# Patient Record
Sex: MALE | Race: WHITE | NOT HISPANIC OR LATINO | URBAN - METROPOLITAN AREA
[De-identification: names, ages, dates, MRNs, and addresses within clinical notes are randomized per-mention and may not be internally consistent; named-entity substitution may affect disease eponyms.]

---

## 2017-09-05 ENCOUNTER — EMERGENCY (EMERGENCY)
Facility: HOSPITAL | Age: 22
LOS: 0 days | Discharge: HOME | End: 2017-09-05
Attending: EMERGENCY MEDICINE | Admitting: EMERGENCY MEDICINE
Payer: SUBSIDIZED

## 2017-09-05 VITALS — WEIGHT: 149.91 LBS | HEIGHT: 70 IN

## 2017-09-05 VITALS
RESPIRATION RATE: 20 BRPM | OXYGEN SATURATION: 100 % | DIASTOLIC BLOOD PRESSURE: 68 MMHG | SYSTOLIC BLOOD PRESSURE: 145 MMHG | HEART RATE: 77 BPM | TEMPERATURE: 98 F

## 2017-09-05 DIAGNOSIS — R10.9 UNSPECIFIED ABDOMINAL PAIN: ICD-10-CM

## 2017-09-05 DIAGNOSIS — N13.2 HYDRONEPHROSIS WITH RENAL AND URETERAL CALCULOUS OBSTRUCTION: ICD-10-CM

## 2017-09-05 LAB
ALBUMIN SERPL ELPH-MCNC: 4.8 G/DL — SIGNIFICANT CHANGE UP (ref 3.3–5)
ALP SERPL-CCNC: 83 U/L — SIGNIFICANT CHANGE UP (ref 40–120)
ALT FLD-CCNC: 32 U/L — SIGNIFICANT CHANGE UP (ref 12–78)
ANION GAP SERPL CALC-SCNC: 11 MMOL/L — SIGNIFICANT CHANGE UP (ref 5–17)
APPEARANCE UR: CLEAR — SIGNIFICANT CHANGE UP
AST SERPL-CCNC: 24 U/L — SIGNIFICANT CHANGE UP (ref 15–37)
BACTERIA # UR AUTO: (no result)
BASOPHILS # BLD AUTO: 0.1 K/UL — SIGNIFICANT CHANGE UP (ref 0–0.2)
BASOPHILS NFR BLD AUTO: 0.7 % — SIGNIFICANT CHANGE UP (ref 0–2)
BILIRUB SERPL-MCNC: 0.6 MG/DL — SIGNIFICANT CHANGE UP (ref 0.2–1.2)
BILIRUB UR-MCNC: NEGATIVE — SIGNIFICANT CHANGE UP
BUN SERPL-MCNC: 19 MG/DL — SIGNIFICANT CHANGE UP (ref 7–23)
CALCIUM SERPL-MCNC: 9.8 MG/DL — SIGNIFICANT CHANGE UP (ref 8.5–10.1)
CHLORIDE SERPL-SCNC: 104 MMOL/L — SIGNIFICANT CHANGE UP (ref 96–108)
CO2 SERPL-SCNC: 23 MMOL/L — SIGNIFICANT CHANGE UP (ref 22–31)
COLOR SPEC: YELLOW — SIGNIFICANT CHANGE UP
CREAT SERPL-MCNC: 1.45 MG/DL — HIGH (ref 0.5–1.3)
DIFF PNL FLD: (no result)
EOSINOPHIL # BLD AUTO: 0 K/UL — SIGNIFICANT CHANGE UP (ref 0–0.5)
EOSINOPHIL NFR BLD AUTO: 0.3 % — SIGNIFICANT CHANGE UP (ref 0–6)
EPI CELLS # UR: SIGNIFICANT CHANGE UP
GLUCOSE SERPL-MCNC: 163 MG/DL — HIGH (ref 70–99)
GLUCOSE UR QL: NEGATIVE MG/DL — SIGNIFICANT CHANGE UP
HCT VFR BLD CALC: 44 % — SIGNIFICANT CHANGE UP (ref 39–50)
HGB BLD-MCNC: 16.1 G/DL — SIGNIFICANT CHANGE UP (ref 13–17)
KETONES UR-MCNC: (no result)
LEUKOCYTE ESTERASE UR-ACNC: (no result)
LIDOCAIN IGE QN: 109 U/L — SIGNIFICANT CHANGE UP (ref 73–393)
LYMPHOCYTES # BLD AUTO: 18.3 % — SIGNIFICANT CHANGE UP (ref 13–44)
LYMPHOCYTES # BLD AUTO: 2.8 K/UL — SIGNIFICANT CHANGE UP (ref 1–3.3)
MCHC RBC-ENTMCNC: 30.5 PG — SIGNIFICANT CHANGE UP (ref 27–34)
MCHC RBC-ENTMCNC: 36.7 GM/DL — HIGH (ref 32–36)
MCV RBC AUTO: 83.1 FL — SIGNIFICANT CHANGE UP (ref 80–100)
MONOCYTES # BLD AUTO: 1.1 K/UL — HIGH (ref 0–0.9)
MONOCYTES NFR BLD AUTO: 6.8 % — SIGNIFICANT CHANGE UP (ref 2–14)
NEUTROPHILS # BLD AUTO: 11.5 K/UL — HIGH (ref 1.8–7.4)
NEUTROPHILS NFR BLD AUTO: 74 % — SIGNIFICANT CHANGE UP (ref 43–77)
NITRITE UR-MCNC: NEGATIVE — SIGNIFICANT CHANGE UP
PH UR: 8 — SIGNIFICANT CHANGE UP (ref 5–8)
PLATELET # BLD AUTO: 376 K/UL — SIGNIFICANT CHANGE UP (ref 150–400)
POTASSIUM SERPL-MCNC: 3.2 MMOL/L — LOW (ref 3.5–5.3)
POTASSIUM SERPL-SCNC: 3.2 MMOL/L — LOW (ref 3.5–5.3)
PROT SERPL-MCNC: 8.2 GM/DL — SIGNIFICANT CHANGE UP (ref 6–8.3)
PROT UR-MCNC: 15 MG/DL
RBC # BLD: 5.3 M/UL — SIGNIFICANT CHANGE UP (ref 4.2–5.8)
RBC # FLD: 10 % — LOW (ref 10.3–14.5)
RBC CASTS # UR COMP ASSIST: (no result) /HPF (ref 0–4)
SODIUM SERPL-SCNC: 138 MMOL/L — SIGNIFICANT CHANGE UP (ref 135–145)
SP GR SPEC: 1.01 — SIGNIFICANT CHANGE UP (ref 1.01–1.02)
UROBILINOGEN FLD QL: 1 MG/DL
WBC # BLD: 15.5 K/UL — HIGH (ref 3.8–10.5)
WBC # FLD AUTO: 15.5 K/UL — HIGH (ref 3.8–10.5)
WBC UR QL: SIGNIFICANT CHANGE UP

## 2017-09-05 PROCEDURE — 74177 CT ABD & PELVIS W/CONTRAST: CPT | Mod: 26

## 2017-09-05 PROCEDURE — 99284 EMERGENCY DEPT VISIT MOD MDM: CPT

## 2017-09-05 RX ORDER — CEPHALEXIN 500 MG
1 CAPSULE ORAL
Qty: 14 | Refills: 0 | OUTPATIENT
Start: 2017-09-05 | End: 2017-09-12

## 2017-09-05 RX ORDER — TAMSULOSIN HYDROCHLORIDE 0.4 MG/1
0.4 CAPSULE ORAL AT BEDTIME
Qty: 0 | Refills: 0 | Status: DISCONTINUED | OUTPATIENT
Start: 2017-09-05 | End: 2017-09-05

## 2017-09-05 RX ORDER — ONDANSETRON 8 MG/1
4 TABLET, FILM COATED ORAL ONCE
Qty: 0 | Refills: 0 | Status: COMPLETED | OUTPATIENT
Start: 2017-09-05 | End: 2017-09-05

## 2017-09-05 RX ORDER — FAMOTIDINE 10 MG/ML
20 INJECTION INTRAVENOUS ONCE
Qty: 0 | Refills: 0 | Status: COMPLETED | OUTPATIENT
Start: 2017-09-05 | End: 2017-09-05

## 2017-09-05 RX ORDER — CEPHALEXIN 500 MG
500 CAPSULE ORAL EVERY 12 HOURS
Qty: 0 | Refills: 0 | Status: DISCONTINUED | OUTPATIENT
Start: 2017-09-05 | End: 2017-09-05

## 2017-09-05 RX ORDER — TAMSULOSIN HYDROCHLORIDE 0.4 MG/1
1 CAPSULE ORAL
Qty: 6 | Refills: 0 | OUTPATIENT
Start: 2017-09-05

## 2017-09-05 RX ORDER — SODIUM CHLORIDE 9 MG/ML
1000 INJECTION INTRAMUSCULAR; INTRAVENOUS; SUBCUTANEOUS ONCE
Qty: 0 | Refills: 0 | Status: COMPLETED | OUTPATIENT
Start: 2017-09-05 | End: 2017-09-05

## 2017-09-05 RX ORDER — MORPHINE SULFATE 50 MG/1
4 CAPSULE, EXTENDED RELEASE ORAL ONCE
Qty: 0 | Refills: 0 | Status: DISCONTINUED | OUTPATIENT
Start: 2017-09-05 | End: 2017-09-05

## 2017-09-05 RX ORDER — OXYCODONE AND ACETAMINOPHEN 5; 325 MG/1; MG/1
1 TABLET ORAL ONCE
Qty: 0 | Refills: 0 | Status: COMPLETED | OUTPATIENT
Start: 2017-09-05 | End: 2017-09-05

## 2017-09-05 RX ADMIN — TAMSULOSIN HYDROCHLORIDE 0.4 MILLIGRAM(S): 0.4 CAPSULE ORAL at 22:36

## 2017-09-05 RX ADMIN — Medication 500 MILLIGRAM(S): at 22:36

## 2017-09-05 RX ADMIN — ONDANSETRON 4 MILLIGRAM(S): 8 TABLET, FILM COATED ORAL at 20:22

## 2017-09-05 RX ADMIN — SODIUM CHLORIDE 1000 MILLILITER(S): 9 INJECTION INTRAMUSCULAR; INTRAVENOUS; SUBCUTANEOUS at 19:19

## 2017-09-05 RX ADMIN — MORPHINE SULFATE 4 MILLIGRAM(S): 50 CAPSULE, EXTENDED RELEASE ORAL at 19:19

## 2017-09-05 RX ADMIN — ONDANSETRON 4 MILLIGRAM(S): 8 TABLET, FILM COATED ORAL at 19:19

## 2017-09-05 RX ADMIN — FAMOTIDINE 20 MILLIGRAM(S): 10 INJECTION INTRAVENOUS at 20:22

## 2017-09-05 RX ADMIN — MORPHINE SULFATE 4 MILLIGRAM(S): 50 CAPSULE, EXTENDED RELEASE ORAL at 20:22

## 2017-09-05 NOTE — ED STATDOCS - OBJECTIVE STATEMENT
22 y/o male with no PMHx presents to the ED c/o abd pain. 22 y/o male with no PMHx presents to the ED c/o right lower abd pain x 2 hours. Pt also c/o N/V, increased urgency of urination, dysuria. Denies diarrhea, hematuria. Nonsmoker. Pt is experiencing discomfort in ED. No recent travel.

## 2017-09-05 NOTE — ED STATDOCS - ENMT, MLM
Nasal mucosa clear.  Mouth with normal mucosa  Throat has no vesicles, no oropharyngeal exudates and uvula is midline. Nasal mucosa clear.  Mouth dry mucosa  Throat has no vesicles, no oropharyngeal exudates and uvula is midline.

## 2017-09-05 NOTE — ED STATDOCS - PROGRESS NOTE DETAILS
Patient seen and evaluated, ED attending note and orders reviewed, will continue with patient follow up and care -Yair Smith PA-C Reviewed labs and CT results with patient, + kidney stone, has some perinephric stranding with 15 white count so will also treat with abx and refer to urology -Yair Smith PA-C

## 2017-09-05 NOTE — ED STATDOCS - ATTENDING CONTRIBUTION TO CARE
I, Juan Albarran MD, personally saw the patient with ACP.  I have personally performed a face to face diagnostic evaluation on this patient.  I have reviewed the ACP note and agree with the history, exam, and plan of care, except as noted.

## 2017-09-05 NOTE — ED ADULT NURSE NOTE - OBJECTIVE STATEMENT
pt presents to Ed with RLQ pain and diff urinating since this afternoon. afebrile. denies n/v/d. breathing si even and unlabored. a&ox3. will continue to monitor and reassess

## 2017-09-05 NOTE — ED STATDOCS - CONSTITUTIONAL, MLM
normal... well appearing, well nourished, and in no apparent distress. pt is in moderate distress secondary to pain

## 2017-09-06 LAB
CULTURE RESULTS: NO GROWTH — SIGNIFICANT CHANGE UP
SPECIMEN SOURCE: SIGNIFICANT CHANGE UP

## 2019-12-09 NOTE — ED ADULT NURSE NOTE - CHIEF COMPLAINT
The patient is a 21y Male complaining of abdominal pain.
Bladder non-tender and non-distended. Urine clear yellow.

## 2025-02-27 NOTE — ED ADULT TRIAGE NOTE - MODE OF ARRIVAL
LMTCB in regards upcoming procedure.  
Patient informed about scheduled procedure on 05/12/2025 pre-surgical instructions given,portal message sent as well.  
Walk in

## 2025-08-05 NOTE — ED STATDOCS - CHPI ED SYMPTOM POS
"  CC:   Chief Complaint   Patient presents with    Diabetes Mellitus       HPI: Catalina Mcgarry is a 70 y.o. female presents for a follow-up visit today for the management of T1DM      She was diagnosed with Type 1 diabetes during pregnancy in 1978 and started on insulin therapy     Family hx of diabetes: mother had T2DM, son with T1DM,   Hospitalized for diabetes?   Insulin therapy?    No personal or FH of thyroid cancer or personal of pancreatic cancer or pancreatitis.     2/2011--- +FLORA and c-peptide <0.1   9/2023---c-peptide - <0.08       Our last visit was in October 2024  Missed follow up visits   Now being seen in August 2025  At our last visit we   -- Medication Changes:   Continued Omnipod 5  Change NovoLog to Fiasp      Stay in automated mode 100% of the time  Reviewed correction boluses---discussed hitting "use sensor"         Pump settings:  Basal:  MN- 12 ( noon)- continue 0.85 units/hr   12 (noon)- MN: increase to 1 unit/hr --adjusted based on download for when she is in manual mode  ICR:1:8--tighten  ISF: 1:40-tighten  Target: 110  IOB: 3 hours      She met with education after our visit today to get the ramesh on her phone for the Omnipod 5     Last A1c is from January 2025- 7.9%                                 DIABETES COMPLICATIONS: retinopathy      Diabetes Management Status    ASA:  No    Statin:taking Crestor 10 mg now  ACE/ARB: Not taking    The 10-year ASCVD risk score (Dalia LIRA, et al., 2019) is: 38.6%    Values used to calculate the score:      Age: 70 years      Sex: Female      Is Non- : Yes      Diabetic: Yes      Tobacco smoker: Yes      Systolic Blood Pressure: 118 mmHg      Is BP treated: No      HDL Cholesterol: 59 mg/dL      Total Cholesterol: 191 mg/dL      Screening or Prevention Patient's value Goal Complete/Controlled?   HgA1C Testing and Control   Lab Results   Component Value Date    HGBA1C 7.9 (H) 01/14/2025      Annually/Less than 8% Yes   Lipid profile : " 01/14/2025 Annually Yes   LDL control Lab Results   Component Value Date    LDLCALC 113.8 01/14/2025    Annually/Less than 100 mg/dl  No   Nephropathy screening Lab Results   Component Value Date    LABMICR 12.0 08/27/2024     Lab Results   Component Value Date    PROTEINUA Negative 09/29/2023    Annually Yes   Blood pressure BP Readings from Last 1 Encounters:   08/06/25 118/64    Less than 140/90 Yes   Dilated retinal exam : 06/17/2014 Annually No   Foot exam   : 12/03/2024 Annually Yes       CURRENT A1C:    Hemoglobin A1C   Date Value Ref Range Status   01/14/2025 7.9 (H) 4.0 - 5.6 % Final     Comment:     ADA Screening Guidelines:  5.7-6.4%  Consistent with prediabetes  >or=6.5%  Consistent with diabetes    High levels of fetal hemoglobin interfere with the HbA1C  assay. Heterozygous hemoglobin variants (HbS, HgC, etc)do  not significantly interfere with this assay.   However, presence of multiple variants may affect accuracy.     10/30/2024 7.6 (H) 4.0 - 5.6 % Final     Comment:     ADA Screening Guidelines:  5.7-6.4%  Consistent with prediabetes  >or=6.5%  Consistent with diabetes    High levels of fetal hemoglobin interfere with the HbA1C  assay. Heterozygous hemoglobin variants (HbS, HgC, etc)do  not significantly interfere with this assay.   However, presence of multiple variants may affect accuracy.     08/26/2024 7.6 (H) 4.0 - 5.6 % Final     Comment:     ADA Screening Guidelines:  5.7-6.4%  Consistent with prediabetes  >or=6.5%  Consistent with diabetes    High levels of fetal hemoglobin interfere with the HbA1C  assay. Heterozygous hemoglobin variants (HbS, HgC, etc)do  not significantly interfere with this assay.   However, presence of multiple variants may affect accuracy.         GOAL A1C: 7% without hypoglycemia       DM MEDICATIONS USED IN THE PAST: Glipizide   Dexcom G6   Omnipod 5   Lantus   Novolog   Irving   Fiasp       CURRENT DIABETES MEDICATIONS:     Insulin Pump: Omnipod 5 with Fiasp   Pump  settings:  Pump settings:  Basal:  MN- 12 ( noon)- continue 0.85 units/hr   12 (noon)- MN: increase to 1 unit/hr --adjusted based on download for when she is in manual mode  ICR:1:9-- she went back to 1:9 from 1:8  ISF: 1:40-tighten  Target: 110  IOB: 3 hours       Pump site change: q 3 days   Cartridge change: q 3 days  Insulin TDD:  37.9 units    Basal 67%   Bolus 33%   CHO intake: 107.5 g   2.6 entries per day     94% in auto mode   1% automated limited   6% manual mode     Back up Lantus/Levemir: Lantus     Patient's pump was downloaded in clinic today and reviewed with patient.   Please see attached documents for pump download.         BLOOD GLUCOSE MONITORING:    Sensor type: Dexcom G6    Average BG readin  Time in range: 63%   GMI- 7.4%  31% high   6% very high   0% low   <1% very low    Site change:  q10 days    2 weeks prior average was 170  GMI- 7.4%  62% in range     Sensor was downloaded in clinic today and reviewed with patient.   Please see attached document for download.       Supplies from [facility]         HYPOGLYCEMIA: 0% low   <1% very low     2 weeks prior- 0% low  0% very low     She does attest to low overnight   If BG is 120-130's before bed- she says that she is dropping overnight   .  Glucagon kit: baqsimi   Medic alert bracelet: denies         MEALS: eating 3 meals per day   She says that she is doing better about CHO counting -- she feels like she is entering in the right amount- -107.5  grams of carbs   BF:  Lunch:   Dinner:-- she sits with Mrs Lee every day from 4PM- 8PM -- sometimes Rosa skips dinner- so she will have a tuna fish sandwich   Snack: little cup of blue bell ice cream   Drinks: premier protein shakes daily - taking away from her milk        CURRENT EXERCISE:  No      Review of Systems  Review of Systems   Constitutional:  Negative for appetite change, fatigue and unexpected weight change.   HENT:  Negative for trouble swallowing.    Eyes:  Negative for visual  disturbance.   Respiratory:  Negative for shortness of breath.    Cardiovascular:  Negative for chest pain.   Gastrointestinal:  Negative for nausea.   Endocrine: Negative for polydipsia, polyphagia and polyuria.   Genitourinary:         No Nocturia    Skin:  Negative for wound.   Neurological:  Negative for numbness.       Physical Exam   Physical Exam  Vitals and nursing note reviewed.   Constitutional:       General: She is not in acute distress.     Appearance: She is well-developed. She is not ill-appearing.   HENT:      Head: Normocephalic and atraumatic.      Right Ear: External ear normal.      Left Ear: External ear normal.      Nose: Nose normal.   Neck:      Thyroid: No thyromegaly.      Trachea: No tracheal deviation.   Cardiovascular:      Rate and Rhythm: Normal rate and regular rhythm.      Heart sounds: No murmur heard.  Pulmonary:      Effort: Pulmonary effort is normal. No respiratory distress.      Breath sounds: Normal breath sounds.   Abdominal:      Palpations: Abdomen is soft.      Tenderness: There is no abdominal tenderness.      Hernia: No hernia is present.   Musculoskeletal:      Cervical back: Normal range of motion and neck supple.   Skin:     General: Skin is warm and dry.      Capillary Refill: Capillary refill takes less than 2 seconds.      Findings: No rash.      Comments: Omnipod and dexcom sites without complications    Neurological:      Mental Status: She is alert and oriented to person, place, and time.      Cranial Nerves: No cranial nerve deficit.   Psychiatric:         Behavior: Behavior normal.         Judgment: Judgment normal.         FOOT EXAMINATION: appropriate footwear   She deferred a foot exam         Lab Results   Component Value Date    TSH 1.580 08/26/2024           Type 1 diabetes mellitus with hyperglycemia  Uncontrolled   Noncompliance with follow up visits are hindering overall management   She is adamant that she is accurately carbohydrate counting but that  "her blood sugar readings are staying high despite entering in the correct amount of carbs-- her CHO ratio was back to 1:9   Now on Fiasp   She would like to get the Omnipod ramesh on her phone and change over to the ramesh and not have to use the    Would like to upgrade to the dexcom G7   She needs to meet with diabetes education for upgrade           -- Medication Changes:   Continue Omnipod 5  Continue the  Fiasp     Stay in automated mode 100% of the time  Reviewed correction boluses---discussed hitting "use sensor"       Pump settings:  Basal:  MN- 12 ( noon)- continue 0.85 units/hr   12 (noon)- MN: increase to 1 unit/hr   ICR:1:8.5--tighten  ISF: 1:40  Target: 110  IOB: 3 hours     Will have her meet with education to get the dexcom G7 upgrade and get the omnipod 5 ramesh on her phone       Discussed the importance of back up insulin if off her pump     -- Reviewed goals of therapy are to get the best control we can without hypoglycemia.  -- Reviewed patient's current insulin regimen. Clarified proper insulin dose and timing in relation to meals, etc. Insulin injection sites and proper rotation instructed.    -- Advised frequent self blood glucose monitoring.  Patient encouraged to document glucose results and bring them to every clinic visit.--upgrade to the dexcom G7- supplies from [facility]   -- Hypoglycemia precautions discussed. Instructed on precautions before driving.    -- Call for Bg repeatedly < 70 or > 180.   -- Close adherence to lifestyle changes recommended.   -- Periodic follow ups for eye evaluations, foot care and dental care suggested.  -- Refer to diabetes education---get the Omnipod 5 ramesh on phone and upgrade to the dexcom G7       Patient has diabetes mellitus and manages diabetes with intensive insulin regimen and uses prandial and basal insulin daily-- via omnipod 5 insulin pump   Patient requires a therapeutic CGM and is willing to use therapeutic CGM for the necessary frequent " adjustments of insulin therapy.  I have completed an in-person visit during the previous 6 months and will continue to have in-person visits every 6 months to assess adherence to their CGM regimen and diabetes treatment plan.  Due to COVID pandemic and need for remote monitoring this tool is medically necessary        Diabetic macular edema, both eyes  See above    Proliferative diabetic retinopathy, both eyes  Optimize BG readings.   See above.   Continue to follow ophthalmology    Insulin pump fitting or adjustment  See above    Insulin pump in place  See above    Dyslipidemia, goal LDL below 100  Discussed ADA recommendations  LDL goal <100  She reports that she has been more compliant with the Crestor  Check lipids with RTC               Pump backup plan    If the insulin pump is non functional and discontinued for anticipated more than 20 hours, please give daily injections of:   Long acting insulin Lantus 24 units daily   Short acting insulin Novolog or Fiasp for meals according to carb ratios and sensitivity factor in the pump. 1:9    When the insulin pump is restarted, do not restart basal rates until at least 22 hours after the last long acting insulin injection. You can set a 0% temporary basal setting that will last until this time and use your pump to bolus for meals and correction.     For any technical insulin pump issues, please contact the insulin pump company; the toll free number is printed on the label on the back of the insulin pump.       If your sugar is running high for a few hours and does not respond to two correction doses from the insulin pump, it may mean that you have a bad pump site and the site should be changed ,          I spent a total of 30 minutes on the day of the visit.This includes face to face time and non-face to face time preparing to see the patient (eg, review of tests), obtaining and/or reviewing separately obtained history, documenting clinical information in the  electronic or other health record, independently interpreting results and communicating results to the patient/family/caregiver, or care coordinator.          Follow up in about 3 months (around 11/6/2025).  1. Labs today -- A1c and BMP  2. She will call to schedule with jadiel when she gets the dexcom G7 - to get it set up with her omnipod 5   3. Follow up with me in 3 months with labs prior       Orders Placed This Encounter   Procedures    Hemoglobin A1C     Standing Status:   Future     Expected Date:   8/6/2025     Expiration Date:   2/6/2027    Basic Metabolic Panel     Standing Status:   Future     Expected Date:   8/6/2025     Expiration Date:   2/6/2027    Hemoglobin A1C     Standing Status:   Future     Expected Date:   11/6/2025     Expiration Date:   2/6/2027    Comprehensive Metabolic Panel     Standing Status:   Future     Expected Date:   11/6/2025     Expiration Date:   2/6/2027    CBC Auto Differential     Standing Status:   Future     Expected Date:   11/6/2025     Expiration Date:   2/6/2027    Lipid Panel     Standing Status:   Future     Expected Date:   11/6/2025     Expiration Date:   2/6/2027    Microalbumin/Creatinine Ratio, Urine     Standing Status:   Future     Expected Date:   11/6/2025     Expiration Date:   2/6/2027     Specimen Source:   Urine    TSH     Standing Status:   Future     Expected Date:   11/6/2025     Expiration Date:   2/6/2027    Ambulatory referral/consult to Diabetes Education     Standing Status:   Future     Expected Date:   8/6/2025     Expiration Date:   2/6/2027     Referral Priority:   Routine     Referral Type:   Consultation     Referral Reason:   Specialty Services Required     Referred to Provider:   Jadiel Horn RD, [facility]     Requested Specialty:   Diabetes     Number of Visits Requested:   1       Recommendations were discussed with the patient in detail  The patient verbalized understanding and agrees with the plan outlined as above.     This note was  partly generated with [employer] voice recognition software. I apologize for any possible typographical errors.   NAUSEA/PAIN/VOMITING